# Patient Record
Sex: MALE
[De-identification: names, ages, dates, MRNs, and addresses within clinical notes are randomized per-mention and may not be internally consistent; named-entity substitution may affect disease eponyms.]

---

## 2018-11-05 ENCOUNTER — HOSPITAL ENCOUNTER (EMERGENCY)
Dept: HOSPITAL 14 - H.ER | Age: 53
Discharge: HOME | End: 2018-11-05
Payer: MEDICARE

## 2018-11-05 VITALS
HEART RATE: 76 BPM | DIASTOLIC BLOOD PRESSURE: 98 MMHG | SYSTOLIC BLOOD PRESSURE: 156 MMHG | TEMPERATURE: 98.7 F | RESPIRATION RATE: 16 BRPM

## 2018-11-05 VITALS — OXYGEN SATURATION: 99 %

## 2018-11-05 VITALS — BODY MASS INDEX: 25 KG/M2

## 2018-11-05 DIAGNOSIS — F17.200: ICD-10-CM

## 2018-11-05 DIAGNOSIS — M25.512: Primary | ICD-10-CM

## 2018-11-05 DIAGNOSIS — M79.10: ICD-10-CM

## 2018-11-05 DIAGNOSIS — I10: ICD-10-CM

## 2018-11-05 DIAGNOSIS — E78.5: ICD-10-CM

## 2018-11-05 LAB
ALBUMIN SERPL-MCNC: 4.3 G/DL (ref 3.5–5)
ALBUMIN/GLOB SERPL: 1.3 {RATIO} (ref 1–2.1)
ALT SERPL-CCNC: 33 U/L (ref 21–72)
AST SERPL-CCNC: 31 U/L (ref 17–59)
BASOPHILS # BLD AUTO: 0.1 K/UL (ref 0–0.2)
BASOPHILS NFR BLD: 0.6 % (ref 0–2)
BUN SERPL-MCNC: 11 MG/DL (ref 9–20)
CALCIUM SERPL-MCNC: 9.2 MG/DL (ref 8.4–10.2)
EOSINOPHIL # BLD AUTO: 0.5 K/UL (ref 0–0.7)
EOSINOPHIL NFR BLD: 4.5 % (ref 0–4)
ERYTHROCYTE [DISTWIDTH] IN BLOOD BY AUTOMATED COUNT: 14.2 % (ref 11.5–14.5)
GFR NON-AFRICAN AMERICAN: > 60
HGB BLD-MCNC: 13.6 G/DL (ref 12–18)
LYMPHOCYTES # BLD AUTO: 2.9 K/UL (ref 1–4.3)
LYMPHOCYTES NFR BLD AUTO: 28.4 % (ref 20–40)
MCH RBC QN AUTO: 29.4 PG (ref 27–31)
MCHC RBC AUTO-ENTMCNC: 32.9 G/DL (ref 33–37)
MCV RBC AUTO: 89.3 FL (ref 80–94)
MONOCYTES # BLD: 0.7 K/UL (ref 0–0.8)
MONOCYTES NFR BLD: 7.1 % (ref 0–10)
NEUTROPHILS # BLD: 6 K/UL (ref 1.8–7)
NEUTROPHILS NFR BLD AUTO: 59.4 % (ref 50–75)
NRBC BLD AUTO-RTO: 0.1 % (ref 0–0)
PLATELET # BLD: 338 K/UL (ref 130–400)
PMV BLD AUTO: 6.9 FL (ref 7.2–11.7)
RBC # BLD AUTO: 4.62 MIL/UL (ref 4.4–5.9)
WBC # BLD AUTO: 10.1 K/UL (ref 4.8–10.8)

## 2018-11-05 PROCEDURE — 99285 EMERGENCY DEPT VISIT HI MDM: CPT

## 2018-11-05 PROCEDURE — 80053 COMPREHEN METABOLIC PANEL: CPT

## 2018-11-05 PROCEDURE — 93005 ELECTROCARDIOGRAM TRACING: CPT

## 2018-11-05 PROCEDURE — 84484 ASSAY OF TROPONIN QUANT: CPT

## 2018-11-05 PROCEDURE — 85025 COMPLETE CBC W/AUTO DIFF WBC: CPT

## 2018-11-05 PROCEDURE — 71046 X-RAY EXAM CHEST 2 VIEWS: CPT

## 2018-11-05 NOTE — RAD
Date of service: 



11/05/2018



HISTORY:

 shortness of breath, cough 



COMPARISON:

Ribs with chest 12/20/2016.



TECHNIQUE:

Chest PA and lateral



FINDINGS:



LUNGS:

No active pulmonary disease.



PLEURA:

No significant pleural effusion identified. No pneumothorax apparent.



CARDIOVASCULAR:

No aortic atherosclerotic calcification present.



Normal cardiac size. No pulmonary vascular congestion. 



OSSEOUS STRUCTURES:

No significant abnormalities.



VISUALIZED UPPER ABDOMEN:

Normal.



OTHER FINDINGS:

None.



IMPRESSION:

No interval acute cardiopulmonary disease appreciated.

## 2018-11-05 NOTE — ED PDOC
HPI: Chest Pain


Time Seen by Provider: 11/05/18 11:13


Chief Complaint (Nursing): Chest Pain


Chief Complaint (Provider): chest and shoulder pain


History Per: Patient


History/Exam Limitations: no limitations


Onset/Duration Of Symptoms: Hrs


Current Symptoms Are (Timing): Still Present


Quality: Sharp


Exacerbating Factors: Other (sitting up and taking a deep breath)


Alleviating Factors: None


Additional Complaint(s): 


52 y/o Male cocaine user and current smoker with hx of HTN, HL,who presents with

sharp Left sided C/P that began this morning. Patient states that he was eating 

breakfast when he suddenly had a sharp pain behind his Left shoulder blade that 

radiated around to his chest and his Left shoulder. The pain in his chest and 

arm were fleeting but the scapular pain persists. Worse with deep inspiration 

and leaning forward. No dizziness, SOB, palpitations, N/V, upper or lower 

extremity numbness. States that he last used cocaine 2 months ago. 





- Risk Factors


PE Risk Factors: 


   Neg: Extremity Immobilization/Fx, Decreased Mobilty /Activity, Previous PE, 

Venous Stasis


TAD Risk Factors: Pos: Hypertension, Sudden Onset Of Pain





Past Medical History


Reviewed: Historical Data, Nursing Documentation, Vital Signs


Vital Signs: 





                                Last Vital Signs











Temp  98.4 F   11/05/18 10:57


 


Pulse  88   11/05/18 10:57


 


Resp  16   11/05/18 10:57


 


BP  169/111 H  11/05/18 12:00


 


Pulse Ox  99   11/05/18 10:57














- Medical History


PMH: HTN, Hyperlipidemia


Other PMH: cocaine use





- Surgical History


Surgical History: Tonsillectomy





- Family History


Family History: States: Stroke (mother - age 70s)





- Social History


Current smoker - smoking cessation education provided: Yes (1pack every 4 days 

for the past 30yrs)


Alcohol: Social (a few beers per week)





- Home Medications


Home Medications: 


                                Ambulatory Orders











 Medication  Instructions  Recorded


 


Acetaminophen with Codeine 1 tab PO Q8H #10 tab 05/26/15





[Tylenol with Codeine No. 3 300  





mg-30 mg]  


 


Naproxen [Naprosyn] 500 mg PO Q12H #20 tab 05/26/15


 


Naproxen 375 mg PO Q8 PRN #21 tab 11/07/15


 


oxyCODONE/Acetaminophen [Percocet 1 ea PO Q6 PRN #10 tab 11/07/15





5/325 mg Tab]  


 


Famotidine [Pepcid] 20 mg PO Q12 #14 tab 12/15/16


 


traMADol [Ultram] 50 mg PO Q6 PRN #12 tab 12/15/16


 


Cyclobenzaprine [Cyclobenzaprine 10 mg PO BID PRN #10 tab 12/22/16





HCl]  


 


Naproxen [Naprosyn] 500 mg PO Q12 PRN #14 tablet 12/22/16


 


Ketorolac Tromethamine [Toradol] 10 mg PO Q6 #20 tab 07/04/17


 


Docusate [Colace] 100 mg PO BID PRN #10 cap 12/19/17


 


Cyclobenzaprine [Cyclobenzaprine 10 mg PO Q8H PRN 3 Days  tab 11/05/18





HCl]  


 


Ibuprofen [Motrin Tab] 600 mg PO Q6H PRN 5 Days  tab 11/05/18














- Allergies


Allergies/Adverse Reactions: 


                                    Allergies











Allergy/AdvReac Type Severity Reaction Status Date / Time


 


No Known Allergies Allergy   Verified 12/15/16 19:44














Review of Systems


ROS Statement: Except As Marked, All Systems Reviewed And Found Negative


Cardiovascular: Positive for: Chest Pain.  Negative for: Palpitations, Edema, 

Light Headedness


Respiratory: Positive for: Pleuritic Pain.  Negative for: Cough, Shortness of 

Breath


Gastrointestinal: Negative for: Nausea, Vomiting


Musculoskeletal: Positive for: Arm Pain.  Negative for: Neck Pain


Neurological: Negative for: Weakness, Numbness





Physical Exam





- Reviewed


Nursing Documentation Reviewed: Yes


Vital Signs Reviewed: Yes





- Physical Exam


Appears: Positive for: Non-toxic


Head Exam: Positive for: ATRAUMATIC


Skin: Positive for: Normal Color


Eye Exam: Positive for: Normal appearance, EOMI, PERRL


ENT: Positive for: Normal ENT Inspection


Neck: Positive for: Normal


Cardiovascular/Chest: Positive for: Regular Rate, Rhythm


Respiratory: Positive for: Normal Breath Sounds


Gastrointestinal/Abdominal: Positive for: Normal Exam (no pulsating mass noted 

in abdomen)


Back: Positive for: Normal Inspection


Extremity: Positive for: Normal ROM.  Negative for: Pedal Edema, Calf Tenderness


Lymphatic: Positive for: Normal Exam


Neurologic/Psych: Positive for: Alert, Oriented





- Laboratory Results


Result Diagrams: 


                                 11/05/18 11:50





                                 11/05/18 11:50





- ECG


ECG: Positive for: Interpreted By Me


ECG Rhythm: Positive for: Sinus Rhythm (NSR, HR 87, no ischemic changes.)


O2 Sat by Pulse Oximetry: 99





Medical Decision Making


Medical Decision Making: 


EKG


CXR


Troponin


CBC, CMP


Ibuprofen 600mg PO x 1





CXR negative for acute abnormality, 


EKG: sinus, HR 87, no ischemic change. 


Trop: < 0.02





Re-evaluated prior to discharge with improvement in scapular and chest pain. 








Disposition





- Clinical Impression


Clinical Impression: 


 Shoulder blade pain, Musculoskeletal pain








- Patient ED Disposition


Is Patient to be Admitted: No


Counseled Patient/Family Regarding: Studies Performed, Diagnosis, Rx Given





- Disposition


Referrals: 


[facility] [Outside]


Disposition: Routine/Home


Disposition Time: 16:37


Condition: STABLE


Additional Instructions: 


Use Ibuprofen and Flexeril as needed for pain for the next few days. Avoid 

excess exercise for the next few days. 


Prescriptions: 


Cyclobenzaprine [Cyclobenzaprine HCl] 10 mg PO Q8H PRN 3 Days  tab


 PRN Reason: Pain, Moderate (4-7)


Ibuprofen [Motrin Tab] 600 mg PO Q6H PRN 5 Days  tab


 PRN Reason: Pain, Moderate (4-7)


Instructions:  Muscle and Bone Pain (DC)


Forms:  [employer] (English)


Print Language: ENGLISH

## 2018-11-05 NOTE — CARD
--------------- APPROVED REPORT --------------





Date of service: 11/05/2018



EKG Measurement

Heart [id number]

WA [id number]

[id number]

[id number]

[id number]



<Conclusion>

Normal sinus rhythm

Normal ECG

## 2019-01-25 ENCOUNTER — HOSPITAL ENCOUNTER (EMERGENCY)
Dept: HOSPITAL 14 - H.ER | Age: 54
Discharge: HOME | End: 2019-01-25
Payer: MEDICARE

## 2019-01-25 VITALS
TEMPERATURE: 98.4 F | HEART RATE: 79 BPM | SYSTOLIC BLOOD PRESSURE: 141 MMHG | OXYGEN SATURATION: 100 % | DIASTOLIC BLOOD PRESSURE: 82 MMHG

## 2019-01-25 VITALS — RESPIRATION RATE: 18 BRPM

## 2019-01-25 VITALS — BODY MASS INDEX: 25 KG/M2

## 2019-01-25 DIAGNOSIS — M79.18: Primary | ICD-10-CM

## 2019-01-25 DIAGNOSIS — M62.830: ICD-10-CM

## 2019-01-25 DIAGNOSIS — I10: ICD-10-CM

## 2019-01-25 LAB
ALBUMIN SERPL-MCNC: 4.2 G/DL (ref 3.5–5)
ALBUMIN/GLOB SERPL: 1.3 {RATIO} (ref 1–2.1)
ALT SERPL-CCNC: 29 U/L (ref 21–72)
AST SERPL-CCNC: 27 U/L (ref 17–59)
BASOPHILS # BLD AUTO: 0.1 K/UL (ref 0–0.2)
BASOPHILS NFR BLD: 0.8 % (ref 0–2)
BILIRUB UR-MCNC: NEGATIVE MG/DL
BUN SERPL-MCNC: 19 MG/DL (ref 9–20)
CALCIUM SERPL-MCNC: 9.7 MG/DL (ref 8.4–10.2)
COLOR UR: COLORLESS
EOSINOPHIL # BLD AUTO: 0.4 K/UL (ref 0–0.7)
EOSINOPHIL NFR BLD: 5.8 % (ref 0–4)
ERYTHROCYTE [DISTWIDTH] IN BLOOD BY AUTOMATED COUNT: 14.2 % (ref 11.5–14.5)
GFR NON-AFRICAN AMERICAN: > 60
GLUCOSE UR STRIP-MCNC: (no result) MG/DL
HGB BLD-MCNC: 14 G/DL (ref 12–18)
LEUKOCYTE ESTERASE UR-ACNC: (no result) LEU/UL
LIPASE SERPL-CCNC: 109 U/L (ref 23–300)
LYMPHOCYTES # BLD AUTO: 2.7 K/UL (ref 1–4.3)
LYMPHOCYTES NFR BLD AUTO: 36.2 % (ref 20–40)
MCH RBC QN AUTO: 30.1 PG (ref 27–31)
MCHC RBC AUTO-ENTMCNC: 33.8 G/DL (ref 33–37)
MCV RBC AUTO: 89.1 FL (ref 80–94)
MONOCYTES # BLD: 0.5 K/UL (ref 0–0.8)
MONOCYTES NFR BLD: 7.1 % (ref 0–10)
NEUTROPHILS # BLD: 3.7 K/UL (ref 1.8–7)
NEUTROPHILS NFR BLD AUTO: 50.1 % (ref 50–75)
NRBC BLD AUTO-RTO: 0.2 % (ref 0–0)
PH UR STRIP: 7 [PH] (ref 5–8)
PLATELET # BLD: 302 K/UL (ref 130–400)
PMV BLD AUTO: 7.2 FL (ref 7.2–11.7)
PROT UR STRIP-MCNC: NEGATIVE MG/DL
RBC # BLD AUTO: 4.64 MIL/UL (ref 4.4–5.9)
RBC # UR STRIP: NEGATIVE /UL
SP GR UR STRIP: < 1.005 (ref 1–1.03)
URINE CLARITY: CLEAR
UROBILINOGEN UR-MCNC: (no result) MG/DL (ref 0.2–1)
WBC # BLD AUTO: 7.4 K/UL (ref 4.8–10.8)

## 2019-01-25 PROCEDURE — 85025 COMPLETE CBC W/AUTO DIFF WBC: CPT

## 2019-01-25 PROCEDURE — 81003 URINALYSIS AUTO W/O SCOPE: CPT

## 2019-01-25 PROCEDURE — 83690 ASSAY OF LIPASE: CPT

## 2019-01-25 PROCEDURE — 99284 EMERGENCY DEPT VISIT MOD MDM: CPT

## 2019-01-25 PROCEDURE — 87086 URINE CULTURE/COLONY COUNT: CPT

## 2019-01-25 PROCEDURE — 96374 THER/PROPH/DIAG INJ IV PUSH: CPT

## 2019-01-25 PROCEDURE — 74176 CT ABD & PELVIS W/O CONTRAST: CPT

## 2019-01-25 PROCEDURE — 80053 COMPREHEN METABOLIC PANEL: CPT

## 2019-01-25 PROCEDURE — 93005 ELECTROCARDIOGRAM TRACING: CPT

## 2019-01-25 NOTE — ED PDOC
HPI: General Adult


Chief Complaint (Provider): Left upper back pain/ musculoskeletal pain 


Additional History Per: Patient


Additional Complaint(s): 





This is 4 y/o M with PMH of HTN, HLD, Gastritis, kidney infection, Chronic lower

back and shoulder pain comes to the ER c/o 4 days hx of left upper back 

pain/muscle spasm. Patient reports pain comes and goes, 7/10, more worse at 

night while on bed, gets better with walking, sharp in nature, radiated to 

lateral back and shoulder. Patient reports on and off nausea and one episode of 

NBNB vomiting last night. Patient denies any substernal chest pain, dizziness, 

vision problem, urinary symptoms, weakness, numbness or tingling. 





PMH: As per HPI


PSH: Tonsillectomy


Allg: NKDA


FH: Denies 


SH: Smokes on and off, 30 years heavy smoking, 2-3 beers/day, denies any alcohol

use





ROS: As per HPI














<Nelly Gonzalez - Last Filed: 01/25/19 18:49>





<Janessa Brandon - Last Filed: 01/25/19 21:04>


Time Seen by Provider: 01/25/19 15:32


Chief Complaint (Nursing): Chest Pain





Supervising Attending Note





- Supervising Attending Note


The Documented history was done by the: Physician Extender, Attending Physician


The documented physical exam was done by the: Physician Extender, Attending 

Physician





- Attestation:


I have personally seen and examined this patient.: Yes


I have fully participated in the care of the patient.: Yes


I have reviewed all pertinent clinical information, including history, physical 

exam and plan: Yes





- Notes:


Notes:: 





LEFT flank pain and CVA tenderness but unremarkable ER workup. Stable for dc 

with clinic followup.





<Janessa Brandon - Last Filed: 01/25/19 21:04>





Past Medical History


Vital Signs: 





                                Last Vital Signs











Temp  97.7 F   01/25/19 15:30


 


Pulse  78   01/25/19 15:30


 


Resp  18   01/25/19 15:30


 


BP  158/97 H  01/25/19 15:30


 


Pulse Ox  98   01/25/19 15:30














- Medical History


PMH: HTN, Hypercholesterolemia, Hyperlipidemia





- Surgical History


Surgical History: Tonsillectomy





- Family History


Family History: States: Unknown Family Hx, Stroke (mother - age 70s)





<Nelly Gonzalez - Last Filed: 01/25/19 18:49>


Vital Signs: 





                                Last Vital Signs











Temp  98.4 F   01/25/19 19:10


 


Pulse  79   01/25/19 19:10


 


Resp  18   01/25/19 19:10


 


BP  141/82   01/25/19 19:10


 


Pulse Ox  100   01/25/19 19:10














<Janessa Brandon - Last Filed: 01/25/19 21:04>





- Home Medications


Home Medications: 


                                Ambulatory Orders











 Medication  Instructions  Recorded


 


Cyclobenzaprine [Cyclobenzaprine 10 mg PO Q8H PRN 3 Days  tab 11/05/18





HCl]  


 


RX: Ibuprofen [Motrin Tab] 600 mg PO Q6H PRN 5 Days  tab 11/05/18


 


Acetaminophen [Tylenol] 650 mg PO Q6H PRN #30 tab 01/25/19


 


Cyclobenzaprine [Cyclobenzaprine 10 mg PO Q8H PRN #15 tab 01/25/19





HCl]  


 


Docusate Sodium [Colace] 100 mg PO BID PRN #20 capsule 01/25/19














- Allergies


Allergies/Adverse Reactions: 


                                    Allergies











Allergy/AdvReac Type Severity Reaction Status Date / Time


 


No Known Allergies Allergy   Verified 01/25/19 15:30














Review of Systems


Constitutional: Negative for: Fever


Eyes: Negative for: Pain


ENT: Negative for: Ear Pain, Ear Discharge


Cardiovascular: Negative for: Chest Pain


Respiratory: Negative for: Cough, Shortness of Breath, Hemoptysis


Gastrointestinal: Negative for: Nausea, Vomiting, Abdominal Pain, Diarrhea


Genitourinary Male: Negative for: Dysuria, Frequency


Musculoskeletal: Positive for: Shoulder Pain, Arm Pain, Back Pain.  Negative 

for: Neck Pain, Leg Pain


Neurological: Negative for: Weakness, Numbness, Incoordination


Psych: Negative for: Anxiety





<Nelly Gonzalez - Last Filed: 01/25/19 18:49>





Physical Exam





- Physical Exam


Appears: Positive for: No Acute Distress


Head Exam: Positive for: NORMAL INSPECTION


Skin: Positive for: Normal Color


Eye Exam: Positive for: Normal appearance, EOMI, PERRL


ENT: Positive for: Normal ENT Inspection


Neck: Positive for: Normal, Painless ROM.  Negative for: Supple


Cardiovascular/Chest: Positive for: Regular Rate, Rhythm.  Negative for: Edema, 

JVD, Murmur


Respiratory: Positive for: Normal Breath Sounds.  Negative for: Decreased Breath

 Sounds, Accessory Muscle Use, Crackles, Stridor


Gastrointestinal/Abdominal: Positive for: Normal Exam, Bowel Sounds, Soft.  

Negative for: Tenderness, Distended, Guarding


Back: Positive for: Muscle Spasm (left upper back. Tenderness of left upper back

 and lateral upper back ).  Negative for: Vertebral Tenderness


Extremity: Positive for: Normal ROM.  Negative for: Tenderness, Pedal Edema


Neurologic/Psych: Positive for: Alert, CNs II-XII, Oriented





<Nelly Gonzalez - Last Filed: 01/25/19 18:49>





- Laboratory Results


Result Diagrams: 


                                 01/25/19 16:28





                                 01/25/19 16:28





- ECG


O2 Sat by Pulse Oximetry: 98





- Progress


ED Course And Treament: 





A/P:





52 y/o M with Left upper back pain radiating to his lateral back. 





- CBC, CMP, Lipase 


- EKG


- UA


- Toradol, Tylenol, Pepcid, Lidoderm patch and Flexeril


- Reevaluation 





Case discussed with Dr. Brandon 





CBC, CMP, LIpase reviewed: WNL





CT Abdo/Pelvis: reviewed, no acute anatomical abnormalities








Patient refused enema 





Patient agrees with discharge plan 


Re-evaluation Time: 18:32


Condition: Improving,but remains with symptoms





<Nelly Gonzalez - Last Filed: 01/25/19 18:49>





- Laboratory Results


Result Diagrams: 


                                 01/25/19 16:28





                                 01/25/19 16:28


Lab Results: 





                                        











Total Bilirubin  0.2 mg/dl (0.2-1.3)   01/25/19  16:28    


 


AST  27 U/L (17-59)   01/25/19  16:28    


 


ALT  29 U/L (21-72)   01/25/19  16:28    


 


Alkaline Phosphatase  127 U/L ()  H  01/25/19  16:28    


 


Total Protein  7.5 G/DL (6.3-8.2)   01/25/19  16:28    


 


Albumin  4.2 g/dL (3.5-5.0)   01/25/19  16:28    


 


Globulin  3.3 gm/dL (2.2-3.9)   01/25/19  16:28    


 


Albumin/Globulin Ratio  1.3  (1.0-2.1)   01/25/19  16:28    








                                        











Lipase  109 U/L ()   01/25/19  16:28    








                                        











Urine Color  Colorless  (YELLOW)   01/25/19  16:50    


 


Urine Clarity  Clear  (Clear)   01/25/19  16:50    


 


Urine pH  7.0  (5.0-8.0)   01/25/19  16:50    


 


Ur Specific Gravity  < 1.005  (1.003-1.030)   01/25/19  16:50    


 


Urine Protein  Negative mg/dL (NEGATIVE)   01/25/19  16:50    


 


Urine Glucose (UA)  Neg mg/dL (NEGATIVE)   01/25/19  16:50    


 


Urine Ketones  Negative mg/dL (NEGATIVE)   01/25/19  16:50    


 


Urine Blood  Negative  (NEGATIVE)   01/25/19  16:50    


 


Urine Nitrate  Negative  (NEGATIVE)   01/25/19  16:50    


 


Urine Bilirubin  Negative  (NEGATIVE)   01/25/19  16:50    


 


Urine Urobilinogen  0.2-1.0 mg/dL (0.2-1.0)   01/25/19  16:50    


 


Ur Leukocyte Esterase  Neg Onelia/uL (Negative)   01/25/19  16:50    














<Janessa Brandon - Last Filed: 01/25/19 21:04>





Medical Decision Making


Medical Decision Making: 





Musculoskeletal pain/Muscle spasm


Constipation 





<Nelly Gonzalez - Last Filed: 01/25/19 18:49>





Disposition





- Patient ED Disposition


Is Patient to be Admitted: No





- Disposition


Disposition: Routine/Home


Disposition Time: 18:35





<Nelly Gonzalez - Last Filed: 01/25/19 18:49>





<Janessa Brandon - Last Filed: 01/25/19 21:04>





- Clinical Impression


Clinical Impression: 


 Musculoskeletal pain, Muscle spasm of back








- Disposition


Referrals: 


Nelly Gonzalez MD [Family Provider] - 


Condition: STABLE


Additional Instructions: 


F/u with PMD on 1/30/19


Bed rest, no heavy lifting 


Return to ER if symptoms do not resolve or gets worse 


Prescriptions: 


Acetaminophen [Tylenol] 650 mg PO Q6H PRN #30 tab


 PRN Reason: Pain, Moderate (4-7)


Cyclobenzaprine [Cyclobenzaprine HCl] 10 mg PO Q8H PRN #15 tab


 PRN Reason: Pain, Moderate (4-7)


Docusate Sodium [Colace] 100 mg PO BID PRN #20 capsule


 PRN Reason: Constipation


Instructions:  Muscle Strain, Constipation in Adults, Muscle Spasms (DC)


Forms:  [id number] (English)


Print Language: ENGLISH

## 2019-01-25 NOTE — CT
Date of service: 



01/25/2019



PROCEDURE:  CT Abdomen and Pelvis without intravenous contrast



HISTORY:

LEFT flank pain



COMPARISON:

CT scan of the abdomen and pelvis dated 12/19/2017



TECHNIQUE:

Contiguous images were obtained from the domes of the diaphragms to 

the upper thighs without the administration of intravenous contrast. 

Oral contrast was not administered.



Radiation dose:



Total exam DLP = 573.92 mGy-cm.



This CT exam was performed using one or more of the following dose 

reduction techniques: Automated exposure control, adjustment of the 

mA and/or kV according to patient size, and/or use of iterative 

reconstruction technique.



FINDINGS:



LOWER THORAX:

Unremarkable. 



LIVER:

Inferior left hepatic lobe punctate calcifications.  No gross lesion 

or ductal dilatation.  



GALLBLADDER AND BILE DUCTS:

Contracted.  Unremarkable. 



PANCREAS:

Unremarkable. No gross lesion or ductal dilatation.



SPLEEN:

Unremarkable. 



ADRENALS:

Unremarkable. No mass. 



KIDNEYS AND URETERS:

Unremarkable. No hydronephrosis. No solid mass. 



VASCULATURE:

Unremarkable. No aortic aneurysm. No aortic atherosclerotic 

calcification or mural plaque present.



BOWEL:

Small hiatal hernia.  Prominent amount of retained colonic stool.  No 

obstruction. No gross mural thickening. 



APPENDIX:

Unremarkable. Normal appendix. 



PERITONEUM:

Small bilateral fat containing inguinal hernias.  No free fluid. No 

free air. 



LYMPH NODES:

Unremarkable. No enlarged lymph nodes. 



BLADDER:

Unremarkable. 



REPRODUCTIVE:

Unremarkable. 



BONES:

No acute fracture. 



OTHER FINDINGS:

None.



IMPRESSION:

No obstructive uropathy or evidence of recently passed genitourinary 

calculus.



Prominent amount of retained colonic stool.



Additional stable findings as above.

## 2019-01-26 NOTE — CARD
--------------- APPROVED REPORT --------------





Date of service: 01/25/2019



EKG Measurement

Heart [id number]

ME 160P49

[id number]

[id number]

[id number]



<Conclusion>

Normal sinus rhythm

Normal ECG

## 2019-03-03 ENCOUNTER — HOSPITAL ENCOUNTER (EMERGENCY)
Dept: HOSPITAL 14 - H.ER | Age: 54
Discharge: HOME | End: 2019-03-03
Payer: MEDICARE

## 2019-03-03 VITALS
DIASTOLIC BLOOD PRESSURE: 76 MMHG | OXYGEN SATURATION: 98 % | SYSTOLIC BLOOD PRESSURE: 130 MMHG | RESPIRATION RATE: 21 BRPM | HEART RATE: 72 BPM

## 2019-03-03 VITALS — TEMPERATURE: 97.6 F

## 2019-03-03 VITALS — BODY MASS INDEX: 25 KG/M2

## 2019-03-03 DIAGNOSIS — F17.200: ICD-10-CM

## 2019-03-03 DIAGNOSIS — K62.5: Primary | ICD-10-CM

## 2019-03-03 DIAGNOSIS — I10: ICD-10-CM

## 2019-03-03 LAB
APTT BLD: 37.4 SECONDS (ref 25.6–37.1)
BASOPHILS # BLD AUTO: 0.1 K/UL (ref 0–0.2)
BASOPHILS NFR BLD: 1.1 % (ref 0–2)
BUN SERPL-MCNC: 10 MG/DL (ref 9–20)
CALCIUM SERPL-MCNC: 9.7 MG/DL (ref 8.4–10.2)
EOSINOPHIL # BLD AUTO: 0.4 K/UL (ref 0–0.7)
EOSINOPHIL NFR BLD: 5.4 % (ref 0–4)
ERYTHROCYTE [DISTWIDTH] IN BLOOD BY AUTOMATED COUNT: 14.4 % (ref 11.5–14.5)
GFR NON-AFRICAN AMERICAN: > 60
HGB BLD-MCNC: 13.8 G/DL (ref 12–18)
INR PPP: 0.9
LYMPHOCYTES # BLD AUTO: 2.5 K/UL (ref 1–4.3)
LYMPHOCYTES NFR BLD AUTO: 31.6 % (ref 20–40)
MCH RBC QN AUTO: 29.7 PG (ref 27–31)
MCHC RBC AUTO-ENTMCNC: 34.1 G/DL (ref 33–37)
MCV RBC AUTO: 87 FL (ref 80–94)
MONOCYTES # BLD: 0.4 K/UL (ref 0–0.8)
MONOCYTES NFR BLD: 4.5 % (ref 0–10)
NEUTROPHILS # BLD: 4.6 K/UL (ref 1.8–7)
NEUTROPHILS NFR BLD AUTO: 57.4 % (ref 50–75)
NRBC BLD AUTO-RTO: 0 % (ref 0–0)
PLATELET # BLD: 344 K/UL (ref 130–400)
PMV BLD AUTO: 6.9 FL (ref 7.2–11.7)
PROTHROMBIN TIME: 10.6 SECONDS (ref 9.8–13.1)
RBC # BLD AUTO: 4.65 MIL/UL (ref 4.4–5.9)
WBC # BLD AUTO: 7.9 K/UL (ref 4.8–10.8)

## 2019-03-03 NOTE — ED PDOC
HPI: Abdomen


Time Seen by Provider: 03/03/19 11:01


Chief Complaint (Nursing): Abdominal Pain


Chief Complaint (Provider): Abdominal Pain


History Per: Patient


History/Exam Limitations: no limitations


Onset/Duration Of Symptoms: Hrs


Additional Complaint(s): 


Patient is a 52 y/o male with a PMHx of HTN, hypercholesterolemia, and 

hyperlipidemia who presents to the ED for evaluation of an episode of rectal 

bleeding this morning. Patient also admits to abdominal pain and an occasional 

sharp, right sided pain on his face. Patient denies hematuria, nausea, and 

vomiting. Of note, patient just recently had a colonoscopy and all his results 

came back normal. 





PCP: None Provided





Past Medical History


Reviewed: Historical Data, Nursing Documentation, Vital Signs


Vital Signs: 





                                Last Vital Signs











Temp  97.8 F   03/03/19 10:52


 


Pulse  67   03/03/19 10:52


 


Resp  18   03/03/19 10:52


 


BP  161/91 H  03/03/19 10:52


 


Pulse Ox  99   03/03/19 10:52














- Medical History


PMH: HTN, Hypercholesterolemia, Hyperlipidemia





- Surgical History


Surgical History: Tonsillectomy





- Family History


Family History: States: Stroke (mother - age 70s)





- Social History


Current smoker - smoking cessation education provided: Yes


Alcohol: Social





- Home Medications


Home Medications: 


                                Ambulatory Orders











 Medication  Instructions  Recorded


 


Acetaminophen [Tylenol] 650 mg PO Q6H PRN #30 tab 01/25/19


 


Atorvastatin [Lipitor] 10 mg PO HS 03/03/19


 


Carvedilol [Coreg] 3.125 mg PO DAILY 03/03/19


 


Hard Fat/Phenylephrine Hydro 1 sup RC DAILY #10 sup 03/03/19





[Anusol Suppository]  


 


amLODIPine [Norvasc] 10 mg PO HS 03/03/19














- Allergies


Allergies/Adverse Reactions: 


                                    Allergies











Allergy/AdvReac Type Severity Reaction Status Date / Time


 


No Known Allergies Allergy   Verified 01/25/19 15:30














Review of Systems


ROS Statement: Except As Marked, All Systems Reviewed And Found Negative


Gastrointestinal: Positive for: Abdominal Pain, Other (Rectal Bleeding).  

Negative for: Nausea, Vomiting


Genitourinary Male: Negative for: Hematuria


Musculoskeletal: Positive for: Other (Sharp, Right-Sided Face Pain)





Physical Exam





- Reviewed


Nursing Documentation Reviewed: Yes


Vital Signs Reviewed: Yes





- Physical Exam


Appears: Positive for: Non-toxic, No Acute Distress


Head Exam: Positive for: ATRAUMATIC, NORMAL INSPECTION, NORMOCEPHALIC


Skin: Positive for: Normal Color, Warm, Dry


Eye Exam: Positive for: EOMI, Normal appearance, PERRL


Neck: Positive for: Normal, Painless ROM, Supple


Cardiovascular/Chest: Positive for: Regular Rate, Rhythm.  Negative for: Murmur


Respiratory: Positive for: Normal Breath Sounds.  Negative for: Respiratory 

Distress


Gastrointestinal/Abdominal: Positive for: Normal Exam, Soft.  Negative for: 

Tenderness


Extremity: Positive for: Normal ROM.  Negative for: Pedal Edema, Deformity


Neurologic/Psych: Positive for: Alert, Oriented.  Negative for: Motor/Sensory 

Deficits





- Laboratory Results


Result Diagrams: 


                                 03/03/19 11:54





                                 03/03/19 11:54





- ECG


O2 Sat by Pulse Oximetry: 99 (RA)


Pulse Ox Interpretation: Normal





Medical Decision Making


Medical Decision Making: 


Time: 1140


Impression: Rectal Bleeding


DDx includes but not limited to hemorrhoid, diverticular disease, and colitis.


Plan:


Type and Screen


BMP


CBC


PTT


Prothrombin Time





 

--------------------------------------------------------------------------------


----------------------------------------


Scribe Attestation:


Documented by Keyshawn Awad, acting as a scribe for Kayleen Grey MD.


Provider Scribe Attestation:


All medical record entries made by the Scribe were at my direction and 

personally dictated by me. I have reviewed the chart and agree that the record 

accurately reflects my personal performance of the history, physical exam, 

medical decision making, and the department course for this patient. I have also

 personally directed, reviewed, and agree with the discharge instructions and 

disposition.





Disposition





- Clinical Impression


Clinical Impression: 


 Rectal bleeding








- Patient ED Disposition


Is Patient to be Admitted: No


Doctor Will See Patient In The: Office


Counseled Patient/Family Regarding: Diagnosis, Need For Followup, Rx Given





- Disposition


Referrals: 


[facility] [Outside]


[facility] [Outside]


Sonu Wolfe [Staff Provider] - 


Disposition: Routine/Home


Disposition Time: 13:20


Condition: STABLE


Prescriptions: 


Hard Fat/Phenylephrine Hydro [Anusol Suppository] 1 sup RC DAILY #10 sup


Instructions:  Bloody Stools, Adult (DC)


Forms:  [employer] (English)





- POA


Present On Arrival: None

## 2019-04-28 ENCOUNTER — HOSPITAL ENCOUNTER (EMERGENCY)
Dept: HOSPITAL 14 - H.ER | Age: 54
Discharge: HOME | End: 2019-04-28
Payer: MEDICARE

## 2019-04-28 VITALS
OXYGEN SATURATION: 100 % | SYSTOLIC BLOOD PRESSURE: 145 MMHG | RESPIRATION RATE: 18 BRPM | DIASTOLIC BLOOD PRESSURE: 84 MMHG

## 2019-04-28 VITALS — HEART RATE: 82 BPM | TEMPERATURE: 98.2 F

## 2019-04-28 VITALS — BODY MASS INDEX: 25 KG/M2

## 2019-04-28 DIAGNOSIS — E78.00: ICD-10-CM

## 2019-04-28 DIAGNOSIS — L23.7: Primary | ICD-10-CM

## 2019-04-28 DIAGNOSIS — L03.119: ICD-10-CM

## 2019-04-28 DIAGNOSIS — I10: ICD-10-CM

## 2019-04-28 NOTE — ED PDOC
HPI: Skin/Bite Injury


Time Seen by Provider: 19 16:42


Chief Complaint (Nursing): Lower Extremity Problem/Injury


Chief Complaint (Provider): Abnormal skin integrity


History Per: Patient


History/Exam Limitations: no limitations


Onset/Duration Of Symptoms: Days (1x week)


Current Symptoms Are (Timing): Still Present


Severity: Moderate


Additional Complaint(s): 





53 year old male with no pertinent past medical history presents to the ED for 

an evaluation for an evaluation of abnormal skin integrity that started 1x week 

ago. Patient states that for the past week he has had a pruritic rash on his 

left leg which travelled to his right leg. Patient states that he noticed the 

rash came about after wearing shorts outside near a lot of lawns recently. 

Patient reports taking benadryl without relief. Patient denies having fevers, 

antipyretic use, pain, or a history of diabetes. 





PMD: [facility]





Past Medical History


Reviewed: Historical Data, Nursing Documentation, Vital Signs


Vital Signs: 





                                Last Vital Signs











Temp  98.2 F   19 17:10


 


Pulse  82   19 17:10


 


Resp  18   19 17:10


 


BP  145/84   19 17:10


 


Pulse Ox  100   19 17:10











ALISON Report Viewed: Yes





- Medical History


PMH: HTN, Hypercholesterolemia, Hyperlipidemia





- Surgical History


Surgical History: Tonsillectomy





- Family History


Family History: States: Stroke (mother- age 70s)





- Social History


Current smoker - smoking cessation education provided: Yes (light)


Alcohol: Social


Drugs: Denies





- Immunization History


Hx Tetanus Toxoid Vaccination: No


Hx Influenza Vaccination: No


Hx Pneumococcal Vaccination: No





- Home Medications


Home Medications: 


                                Ambulatory Orders











 Medication  Instructions  Recorded


 


Acetaminophen [Tylenol] 650 mg PO Q6H PRN #30 tab 19


 


Atorvastatin [Lipitor] 10 mg PO HS 19


 


Carvedilol [Coreg] 3.125 mg PO DAILY 19


 


Hard Fat/Phenylephrine Hydro 1 sup RC DAILY #10 sup 19





[Anusol Suppository]  


 


amLODIPine [Norvasc] 10 mg PO HS 19


 


Cephalexin [cephalexin] 500 mg PO Q6 #28 cap 19


 


Methylprednisolone [Medrol Dose 4 mg PO DAILY #21 mg 19





Pack (21 tabs)]  














- Allergies


Allergies/Adverse Reactions: 


                                    Allergies











Allergy/AdvReac Type Severity Reaction Status Date / Time


 


No Known Allergies Allergy   Verified 19 15:30














Review of Systems


ROS Statement: Except As Marked, All Systems Reviewed And Found Negative


Constitutional: Negative for: Fever


Skin: Positive for: Rash (pruritic rash on left leg, travelled to right leg. (-)

pain)





Physical Exam





- Reviewed


Nursing Documentation Reviewed: Yes


Vital Signs Reviewed: Yes





- Physical Exam


Appears: Positive for: Well, Non-toxic, No Acute Distress


Head Exam: Positive for: ATRAUMATIC, NORMOCEPHALIC


Skin: Positive for: Warm, Dry, Rash (bilateral lower extremities with faint 

erythema, scattered vesicles with the greatest amount to the left calf. (-) 

discharge, (-) foul odor, (-) streaking. erythema noncircumferential.)


Pulses-Dorsalis Pedis (L): 2+


Pulses-Dorsalis Pedis (R): 2+


Neurological/Psych: Positive for: Awake, Alert, Oriented (3x)





- ECG


O2 Sat by Pulse Oximetry: 100 (RA)


Pulse Ox Interpretation: Normal





Medical Decision Making


Medical Decision Makin:42


Initial impression: 53 year old male with a rash


Initial plan:


Advised patient to follow up with [facility] in 2x days or in ED, but if symptoms 

worsen or if fever develops, return to the ED immediately.








 

--------------------------------------------------------------------------------


---------------------------------------


Scribe Attestation:


Documented by Ansley Babin, acting as a scribe for Mitchell VALLADARES





Provider Scribe Attestation:


All medical record entries made by the Scribe were at my direction and 

personally dictated by me. I have reviewed the chart and agree that the record 

accurately reflects my personal performance of the history, physical exam, 

medical decision making, and the department course for this patient. I have also

personally directed, reviewed, and agree with the discharge instructions and 

disposition.





Disposition





- Clinical Impression


Clinical Impression: 


 Rhus dermatitis, Cellulitis








- Patient ED Disposition


Is Patient to be Admitted: No





- Disposition


Referrals: 


[facility] [Outside]


Disposition: Routine/Home


Disposition Time: 17:10


Condition: STABLE


Additional Instructions: 


FOLLOW UP WITH [facility] FOR FURTHER EVALUATION


RETURN TO ED IMMEDIATELY IF SYMPTOMS WORSEN





ALIZE RANDHAWA , thank you for letting us take care of you today. Your 

provider was Janessa Brandon MD and you were treated for LT FOOT 

PAIN/SWOLLEN. The emergency medical care you received today was directed at your

acute symptoms. If you were prescribed any medication, please fill it and take 

as directed. It may take several days for your symptoms to resolve. Return to 

the Emergency Department if your symptoms worsen, do not improve, or if you have

any other problems.





Please contact your doctor or call one of the physicians/clinics you have been 

referred to that are listed on the Patient Visit Information form that is 

included in your discharge packet. Bring any paperwork you were given at dis

charge with you along with any medications you are taking to your follow up 

visit. Our treatment cannot replace ongoing medical care by a primary care 

provider outside of the emergency department.





Thank you for allowing the [facility] team to be part of your care today.








If you had an X-Ray or CT scan: A Radiologist will review the ED reading if any 

change in treatment is needed we will contact you.***





If you had a blood, urine, or wound culture: It will take several days for the 

results, if any change in treatment is needed we will contact you.***





If you had an STI test: It will take 48 hours for the results. Please call after

1 week if you have not heard back.***


Prescriptions: 


Cephalexin [cephalexin] 500 mg PO Q6 #28 cap


Methylprednisolone [Medrol Dose Pack (21 tabs)] 4 mg PO DAILY #21 mg


Instructions:  Poison Ivy, Poison Oak, Poison [place]ac (DC), Cellulitis (Skin 

Infection), Adult (DC)


Forms:  [employer] (English)


Print Language: ENGLISH

## 2019-04-30 ENCOUNTER — HOSPITAL ENCOUNTER (EMERGENCY)
Dept: HOSPITAL 14 - H.ER | Age: 54
Discharge: HOME | End: 2019-04-30
Payer: MEDICARE

## 2019-04-30 VITALS — BODY MASS INDEX: 25 KG/M2

## 2019-04-30 VITALS — HEART RATE: 80 BPM | DIASTOLIC BLOOD PRESSURE: 99 MMHG | SYSTOLIC BLOOD PRESSURE: 156 MMHG | RESPIRATION RATE: 18 BRPM

## 2019-04-30 VITALS — TEMPERATURE: 97.8 F

## 2019-04-30 DIAGNOSIS — I10: ICD-10-CM

## 2019-04-30 DIAGNOSIS — E78.00: ICD-10-CM

## 2019-04-30 DIAGNOSIS — R21: Primary | ICD-10-CM

## 2019-04-30 LAB
ALBUMIN SERPL-MCNC: 4.7 G/DL (ref 3.5–5)
ALBUMIN/GLOB SERPL: 1.4 {RATIO} (ref 1–2.1)
ALT SERPL-CCNC: 38 U/L (ref 21–72)
APTT BLD: 37 SECONDS (ref 25.6–37.1)
AST SERPL-CCNC: 58 U/L (ref 17–59)
BASOPHILS # BLD AUTO: 0 K/UL (ref 0–0.2)
BASOPHILS NFR BLD: 0.6 % (ref 0–2)
BUN SERPL-MCNC: 12 MG/DL (ref 9–20)
CALCIUM SERPL-MCNC: 10.1 MG/DL (ref 8.4–10.2)
EOSINOPHIL # BLD AUTO: 0.3 K/UL (ref 0–0.7)
EOSINOPHIL NFR BLD: 3.5 % (ref 0–4)
ERYTHROCYTE [DISTWIDTH] IN BLOOD BY AUTOMATED COUNT: 14.9 % (ref 11.5–14.5)
GFR NON-AFRICAN AMERICAN: > 60
HGB BLD-MCNC: 13.5 G/DL (ref 12–18)
INR PPP: 0.9
LYMPHOCYTES # BLD AUTO: 1.8 K/UL (ref 1–4.3)
LYMPHOCYTES NFR BLD AUTO: 20.7 % (ref 20–40)
MCH RBC QN AUTO: 29.7 PG (ref 27–31)
MCHC RBC AUTO-ENTMCNC: 33.9 G/DL (ref 33–37)
MCV RBC AUTO: 87.7 FL (ref 80–94)
MONOCYTES # BLD: 0.4 K/UL (ref 0–0.8)
MONOCYTES NFR BLD: 4.5 % (ref 0–10)
NEUTROPHILS # BLD: 6.1 K/UL (ref 1.8–7)
NEUTROPHILS NFR BLD AUTO: 70.7 % (ref 50–75)
NRBC BLD AUTO-RTO: 0 % (ref 0–0)
PLATELET # BLD: 354 K/UL (ref 130–400)
PMV BLD AUTO: 6.6 FL (ref 7.2–11.7)
PROTHROMBIN TIME: 10.6 SECONDS (ref 9.8–13.1)
RBC # BLD AUTO: 4.54 MIL/UL (ref 4.4–5.9)
WBC # BLD AUTO: 8.6 K/UL (ref 4.8–10.8)

## 2019-04-30 NOTE — ED PDOC
HPI: Skin/Bite Injury


Time Seen by Provider: 04/30/19 08:31


Chief Complaint (Nursing): Abnormal Skin Integrity


Chief Complaint (Provider): Rash


History Per: Patient


History/Exam Limitations: no limitations


Onset/Duration Of Symptoms: Days


Current Symptoms Are (Timing): Still Present


Location Of Injury: Right: Leg, Left: Leg


Quality Of Symptoms: Itching


Additional History Per: Patient


Additional Complaint(s): 


52yo male, otherwise well, comes to ER for evaluation of rash to bilateral lower

legs. Patient was evaluated in this ER 3 days ago for same complaint, was 

discharged home on keflex and Medrol dose pack. Patient states the rash is 

persistent, has worsened and now present on both legs. He denies any pain, or 

discharge and states it is very itchy. No additional complaints.





PMD: [facility]





Past Medical History


Reviewed: Historical Data, Nursing Documentation, Vital Signs


Vital Signs: 





                                Last Vital Signs











Temp  97.8 F   04/30/19 08:04


 


Pulse  85   04/30/19 08:04


 


Resp  19   04/30/19 08:04


 


BP  168/96 H  04/30/19 08:04


 


Pulse Ox  97   04/30/19 08:04














- Medical History


PMH: HTN, Hypercholesterolemia, Hyperlipidemia





- Surgical History


Surgical History: Tonsillectomy





- Family History


Family History: States: Unknown Family Hx, Stroke (mother- age 70s)





- Immunization History


Hx Tetanus Toxoid Vaccination: No


Hx Influenza Vaccination: No


Hx Pneumococcal Vaccination: No





- Home Medications


Home Medications: 


                                Ambulatory Orders











 Medication  Instructions  Recorded


 


Acetaminophen [Tylenol] 650 mg PO Q6H PRN #30 tab 01/25/19


 


Atorvastatin [Lipitor] 10 mg PO HS 03/03/19


 


Carvedilol [Coreg] 3.125 mg PO DAILY 03/03/19


 


Hard Fat/Phenylephrine Hydro 1 sup RC DAILY #10 sup 03/03/19





[Anusol Suppository]  


 


amLODIPine [Norvasc] 10 mg PO HS 03/03/19


 


Cephalexin [cephalexin] 500 mg PO Q6 #28 cap 04/28/19


 


Methylprednisolone [Medrol Dose 4 mg PO DAILY #21 mg 04/28/19





Pack (21 tabs)]  


 


Cetirizine HCl [24Hour Allergy] 10 mg PO DAILY #10 tablet 04/30/19


 


Hydrocortisone 0.5% [Cortizone 1 / TP BID PRN #1 tube 04/30/19





0.5%]  














- Allergies


Allergies/Adverse Reactions: 


                                    Allergies











Allergy/AdvReac Type Severity Reaction Status Date / Time


 


No Known Allergies Allergy   Verified 01/25/19 15:30














Review of Systems


ROS Statement: Except As Marked, All Systems Reviewed And Found Negative


Skin: Positive for: Rash





Physical Exam





- Reviewed


Nursing Documentation Reviewed: Yes


Vital Signs Reviewed: Yes





- Physical Exam


Appears: Positive for: Non-toxic


Skin: Positive for: Rash (coalesced erythematous rash present medially on left 

lower leg; patchy, erythematous rash on right lower leg; no discharge, 

tenderness)


Eye Exam: Positive for: Normal appearance


Cardiovascular/Chest: Negative for: Tachycardia


Respiratory: Negative for: Respiratory Distress


Extremity: Positive for: Normal ROM.  Negative for: Pedal Edema


Neurological/Psych: Positive for: Awake, Alert, Normal Tone





- Laboratory Results


Result Diagrams: 


                                 04/30/19 09:25





                                 04/30/19 09:25





- ECG


O2 Sat by Pulse Oximetry: 97 (RA)


Pulse Ox Interpretation: Normal





Medical Decision Making


Medical Decision Making: 


Impression: Rash


Plan:


-- Labs





--------------------------------------------------------------------------

-----------------------


ScribeAttestation:


Documented byRadha Moreno, acting as a scribe for Ophelia Medina MD.





Provider ScribeAttestation:


All medical record entries made by the Scribe were at my direction and 

personally dictated by me. I have reviewed the chart and agree that the record 

accurately reflects my personal performance of the history, physical exam, 

medical decision making, and the department course for this patient. I have also

personally directed, reviewed, and agree with the discharge instructions and 

disposition.





Disposition





- Clinical Impression


Clinical Impression: 


 Rash








- Disposition


Referrals: 


[facility] [Outside]


Brayan Mensah MD [Staff Provider] - 


Disposition: Routine/Home


Disposition Time: 12:35


Condition: STABLE


Prescriptions: 


Cetirizine HCl [24Hour Allergy] 10 mg PO DAILY #10 tablet


Hydrocortisone 0.5% [Cortizone 0.5%] 1 / TP BID PRN #1 tube


 PRN Reason: Rash


Instructions:  Skin Rash


Forms:  [facility] (English)

## 2019-05-06 VITALS — OXYGEN SATURATION: 97 %
